# Patient Record
(demographics unavailable — no encounter records)

---

## 2025-01-06 NOTE — REASON FOR VISIT
[Consultation] : a consultation visit [FreeTextEntry1] : colon cancer screening, initial evaluation

## 2025-01-06 NOTE — HISTORY OF PRESENT ILLNESS
[FreeTextEntry1] : Patient is a 51 year female, with PMH anxiety, hashimoto's thyroiditis, who presents for colon cancer screening.   Patient has not had a colonoscopy in the past. Patient states her mother, and possibly her father had colon polyps. Pt has a paternal cousin who had rectal cancer at 32 y/o and admits that watching her cousin struggle has been difficult. Her cousin is now .   Patient overall feeling well, asymptomatic. BMs are daily, soft, no straining or bleeding. Patient denies pyrosis, dysphagia, abdominal pain, change in BMs, rectal bleeding, nausea, vomiting, or unexplained weight loss.   Patient denies any significant cardiac or pulmonary conditions.  Recent labs done by PCP in 2024, we do not have a copy to review today.

## 2025-01-06 NOTE — PHYSICAL EXAM
[Alert] : alert [Normal Voice/Communication] : normal voice/communication [Healthy Appearing] : healthy appearing [No Acute Distress] : no acute distress [Obese (BMI >= 30)] : obese (BMI >= 30) [Sclera] : the sclera and conjunctiva were normal [Hearing Threshold Finger Rub Not [place]] : hearing was normal [Normal Lips/Gums] : the lips and gums were normal [Normal Appearance] : the appearance of the neck was normal [No Respiratory Distress] : no respiratory distress [No Acc Muscle Use] : no accessory muscle use [Respiration, Rhythm And Depth] : normal respiratory rhythm and effort [Auscultation Breath Sounds / Voice Sounds] : lungs were clear to auscultation bilaterally [Heart Rate And Rhythm] : heart rate was normal and rhythm regular [Normal S1, S2] : normal S1 and S2 [Bowel Sounds] : normal bowel sounds [Abdomen Tenderness] : non-tender [No Masses] : no abdominal mass palpated [Abdomen Soft] : soft [] : no hepatosplenomegaly [Oriented To Time, Place, And Person] : oriented to person, place, and time

## 2025-01-06 NOTE — ASSESSMENT
[FreeTextEntry1] : Patient is a 51 year female, with PMH anxiety, hashimoto's thyroiditis, who presents for colon cancer screening. Patient has not had a colonoscopy in the past. Patient states her mother, and possibly her father had colon polyps.   Colonoscopy to be scheduled. I have discussed the indications, risks and benefits of procedure with patient. Risks include, but not limited to, bleeding, perforation, infection, and reaction to anesthesia. Alternatives to colonoscopy discussed with patient. Patient was given the opportunity to ask questions, all questions were answered. The patient agrees to proceed with colonoscopy. Patient is medically optimized for colonoscopy.   Suprep to be used. Bowel prep instructions discussed at length.

## 2025-05-27 NOTE — HISTORY OF PRESENT ILLNESS
[Currently Active] : currently active [FreeTextEntry1] : 50 yo had menapause at 2015, has factor5 leiden was told no HT. She has no gyn complaints today. Brother age 55  1  mo ago from cancer, she is not sure primary,maybe  liver Her cousin dies of colon ca age 39, not genetic origin. PT having first  colonoscpy next week.  [Mammogramdate] : 2024 [BoneDensityDate] : 2024 [ColonoscopyDate] : never

## 2025-05-27 NOTE — DISCUSSION/SUMMARY
[FreeTextEntry1] : Well woman exam  pap done mammo ordered bone density -utd, had 2024 recommended taking vit. D 2000 units daily and through diet obtain 1200 mg of calcium along with 2.5 hours of weight bearing exercise per week return in 1 year

## 2025-06-09 NOTE — PHYSICAL EXAM
[Alert] : alert [Normal Voice/Communication] : normal voice/communication [Healthy Appearing] : healthy appearing [No Acc Muscle Use] : no accessory muscle use [No Respiratory Distress] : no respiratory distress [Respiration, Rhythm And Depth] : normal respiratory rhythm and effort [Heart Rate And Rhythm] : heart rate was normal and rhythm regular [Normal S1, S2] : normal S1 and S2 [Auscultation Breath Sounds / Voice Sounds] : lungs were clear to auscultation bilaterally [Bowel Sounds] : normal bowel sounds [Abdomen Tenderness] : non-tender [No Masses] : no abdominal mass palpated [Abdomen Soft] : soft [Oriented To Time, Place, And Person] : oriented to person, place, and time

## 2025-06-09 NOTE — REASON FOR VISIT
[Follow-up] : a follow-up of an existing diagnosis [FreeTextEntry1] : gerd, bloating, epigastric pain

## 2025-06-09 NOTE — ASSESSMENT
[FreeTextEntry1] : A/P family hx of colon polyps  I discussed the risks and benefits of colonoscopy and patient was given opportunity to ask questions. Colonoscopy to r/o colon cancer, polyps, AVM's. Patient is medically optimized for the procedure